# Patient Record
Sex: FEMALE | Race: WHITE | NOT HISPANIC OR LATINO | URBAN - METROPOLITAN AREA
[De-identification: names, ages, dates, MRNs, and addresses within clinical notes are randomized per-mention and may not be internally consistent; named-entity substitution may affect disease eponyms.]

---

## 2017-03-30 ENCOUNTER — EMERGENCY (EMERGENCY)
Facility: HOSPITAL | Age: 52
LOS: 1 days | Discharge: HOME | End: 2017-03-30
Admitting: EMERGENCY MEDICINE
Payer: COMMERCIAL

## 2017-03-30 VITALS
RESPIRATION RATE: 15 BRPM | DIASTOLIC BLOOD PRESSURE: 62 MMHG | SYSTOLIC BLOOD PRESSURE: 131 MMHG | OXYGEN SATURATION: 100 % | HEART RATE: 73 BPM | TEMPERATURE: 98 F

## 2017-03-30 PROCEDURE — 12001 RPR S/N/AX/GEN/TRNK 2.5CM/<: CPT

## 2017-03-30 PROCEDURE — 99283 EMERGENCY DEPT VISIT LOW MDM: CPT | Mod: 25

## 2017-03-30 PROCEDURE — 73140 X-RAY EXAM OF FINGER(S): CPT | Mod: 26,LT

## 2017-03-30 RX ORDER — TETANUS TOXOID, REDUCED DIPHTHERIA TOXOID AND ACELLULAR PERTUSSIS VACCINE, ADSORBED 5; 2.5; 8; 8; 2.5 [IU]/.5ML; [IU]/.5ML; UG/.5ML; UG/.5ML; UG/.5ML
0.5 SUSPENSION INTRAMUSCULAR ONCE
Qty: 0 | Refills: 0 | Status: COMPLETED | OUTPATIENT
Start: 2017-03-30 | End: 2017-03-30

## 2017-03-30 RX ADMIN — TETANUS TOXOID, REDUCED DIPHTHERIA TOXOID AND ACELLULAR PERTUSSIS VACCINE, ADSORBED 0.5 MILLILITER(S): 5; 2.5; 8; 8; 2.5 SUSPENSION INTRAMUSCULAR at 18:37

## 2017-03-30 NOTE — ED PROVIDER NOTE - MEDICAL DECISION MAKING DETAILS
53 yo F no pmhx here with lac to L thumb s/p cutting self with knife. PLAN: tetanus, xr, suture repair 53 yo F no pmhx here with lac to L thumb s/p cutting self with knife. Laceration small, superficial, on finger tip of L thumb. No evidence of any ligamentous or bony injury. XR clear.  PLAN: tetanus, xr, lac repair 51 yo F no pmhx here with lac to L thumb s/p cutting self with knife. Laceration small, superficial, on finger tip of L thumb. No evidence of any ligamentous or bony injury. XR clear.  PLAN: tetanus, xr, lac repair  [place]hou: Laceration to lt thumb bleeding controled, sensation ok. close laceration.

## 2017-03-30 NOTE — ED PROVIDER NOTE - ATTENDING CONTRIBUTION TO CARE
I performed a history and physical exam of the patient and discussed their management with the advanced care provider. I reviewed the advanced care provider's note and agree with the documented findings and plan of care. My medical decision making and objective findings are found above.  Nl motion, bleeding controlled sensation ok.

## 2017-03-30 NOTE — ED PROVIDER NOTE - PHYSICAL EXAMINATION
L thumb: L thumb: FROM, NVI, sensate intact, opposition intact, no evidence of ligamentous injury. 1 cm rounded laceration to L thumb finger tip. Superficial in nature. Hemostasis.

## 2017-03-30 NOTE — ED PROVIDER NOTE - PLAN OF CARE
Keep area clean and dry for 24 hours. Do not apply any lotions or ointments for 5-7 days. Minimize hand washing/scrubbing. Rest, drink plenty of fluids.  Advance activity as tolerated.  Continue all previously prescribed medications as directed. You can use motrin 600mg every 6-8 hours for pain or fever, and/or Tylenol 650 mg every 4 hours for pain/fever. Follow up with your primary care physician in 48-72 hours- bring copies of your results.  Return to the emergency department for chest pain, shortness of breath, dizziness, or worsening, concerning, or persistent symptoms.

## 2017-03-30 NOTE — ED PROVIDER NOTE - OBJECTIVE STATEMENT
53 yo F no pmhx here with lac to L thumb. Injured self while cutting food, accidently cut finger with knife approx 4:30pm. Denies other injuries. Tetanus not up today. No meds. Denies fever , chills, vomiting, numbness, tingling, weakness 51 yo F no pmhx here with lac to L thumb fingertip. Injured self while cutting food, accidently cut finger with knife approx 4:30pm. + bleeding at home stopped with rag/gauze. Denies other injuries. Tetanus not up to date. No meds taken at home PTA. Denies fever , chills, vomiting, numbness, tingling, weakness

## 2017-03-30 NOTE — ED PROCEDURE NOTE - PROCEDURE ADDITIONAL DETAILS
Pt with 1 cm laceration to finger tip of L first digit   approx 1 cm circular laceration creating flap on finger pad 75% attached   No active bleeding  FROM of digit  NVI   sensate intact  opposition intact  wound irrigated with NS  dermabond applied  wrapped with non adherent and kerlex   patient tolerated well

## 2017-03-30 NOTE — ED PROVIDER NOTE - CARE PLAN
Principal Discharge DX:	Laceration  Instructions for follow-up, activity and diet:	Keep area clean and dry for 24 hours. Do not apply any lotions or ointments for 5-7 days. Minimize hand washing/scrubbing. Rest, drink plenty of fluids.  Advance activity as tolerated.  Continue all previously prescribed medications as directed. You can use motrin 600mg every 6-8 hours for pain or fever, and/or Tylenol 650 mg every 4 hours for pain/fever. Follow up with your primary care physician in 48-72 hours- bring copies of your results.  Return to the emergency department for chest pain, shortness of breath, dizziness, or worsening, concerning, or persistent symptoms.

## 2017-06-20 ENCOUNTER — APPOINTMENT (OUTPATIENT)
Dept: MAMMOGRAPHY | Facility: IMAGING CENTER | Age: 52
End: 2017-06-20

## 2017-06-20 ENCOUNTER — OUTPATIENT (OUTPATIENT)
Dept: OUTPATIENT SERVICES | Facility: HOSPITAL | Age: 52
LOS: 1 days | End: 2017-06-20
Payer: COMMERCIAL

## 2017-06-20 ENCOUNTER — APPOINTMENT (OUTPATIENT)
Dept: ULTRASOUND IMAGING | Facility: IMAGING CENTER | Age: 52
End: 2017-06-20

## 2017-06-20 DIAGNOSIS — Z00.8 ENCOUNTER FOR OTHER GENERAL EXAMINATION: ICD-10-CM

## 2017-06-20 PROCEDURE — 77067 SCR MAMMO BI INCL CAD: CPT

## 2017-06-20 PROCEDURE — 76641 ULTRASOUND BREAST COMPLETE: CPT

## 2017-06-20 PROCEDURE — 77063 BREAST TOMOSYNTHESIS BI: CPT

## 2017-06-27 DIAGNOSIS — N60.12 DIFFUSE CYSTIC MASTOPATHY OF LEFT BREAST: ICD-10-CM

## 2017-06-27 DIAGNOSIS — N60.11 DIFFUSE CYSTIC MASTOPATHY OF RIGHT BREAST: ICD-10-CM

## 2017-06-27 DIAGNOSIS — Z12.31 ENCOUNTER FOR SCREENING MAMMOGRAM FOR MALIGNANT NEOPLASM OF BREAST: ICD-10-CM

## 2018-08-14 ENCOUNTER — EMERGENCY (EMERGENCY)
Facility: HOSPITAL | Age: 53
LOS: 1 days | Discharge: HOME | End: 2018-08-14
Attending: EMERGENCY MEDICINE | Admitting: EMERGENCY MEDICINE
Payer: COMMERCIAL

## 2018-08-14 VITALS
TEMPERATURE: 98 F | RESPIRATION RATE: 14 BRPM | HEART RATE: 65 BPM | SYSTOLIC BLOOD PRESSURE: 119 MMHG | DIASTOLIC BLOOD PRESSURE: 60 MMHG | OXYGEN SATURATION: 100 %

## 2018-08-14 VITALS
TEMPERATURE: 99 F | OXYGEN SATURATION: 100 % | DIASTOLIC BLOOD PRESSURE: 68 MMHG | RESPIRATION RATE: 16 BRPM | SYSTOLIC BLOOD PRESSURE: 138 MMHG | HEART RATE: 73 BPM

## 2018-08-14 PROCEDURE — 99283 EMERGENCY DEPT VISIT LOW MDM: CPT | Mod: 25

## 2018-08-14 PROCEDURE — 73552 X-RAY EXAM OF FEMUR 2/>: CPT | Mod: 26,RT

## 2018-08-14 RX ORDER — ACETAMINOPHEN 500 MG
1 TABLET ORAL
Qty: 50 | Refills: 0 | OUTPATIENT
Start: 2018-08-14

## 2018-08-14 NOTE — ED PROVIDER NOTE - PLAN OF CARE
Take medications as prescribed. Use cold compresses (such as ice in a plastic bag) over affected areas for 15 minutes 3 times a day x 3 days. Follow up with your healthcare provider about this issue (these issues) if symptoms persist beyond 2 weeks.

## 2018-08-14 NOTE — ED ADULT NURSE NOTE - OBJECTIVE STATEMENT
53 year old female presents with right leg pain after falling out of a van saturday no loc appears comfortable states her pain is an 8/10   xrays completed plan of care discussed

## 2018-08-14 NOTE — ED PROVIDER NOTE - CARE PLAN
Principal Discharge DX:	Right thigh pain  Secondary Diagnosis:	Fall Principal Discharge DX:	Right thigh pain  Assessment and plan of treatment:	Take medications as prescribed. Use cold compresses (such as ice in a plastic bag) over affected areas for 15 minutes 3 times a day x 3 days. Follow up with your healthcare provider about this issue (these issues) if symptoms persist beyond 2 weeks.  Secondary Diagnosis:	Fall  Secondary Diagnosis:	Contusion

## 2018-08-14 NOTE — ED PROVIDER NOTE - OBJECTIVE STATEMENT
Dell: 53 F, 2 days ago mechanical fall from van, hit R thigh. Pain, burning, constant, throbbing, failed motrin and ice. No other injury. Worse when bending knee. Dell: 53 F, 2 days ago mechanical fall from van, hit R thigh against van door. Pain, burning, constant, throbbing, failed motrin and ice. No other injury. Worse when bending knee.

## 2018-08-14 NOTE — ED ADULT NURSE NOTE - NSIMPLEMENTINTERV_GEN_ALL_ED
Implemented All Fall Risk Interventions:  [place] to call system. Call bell, personal items and telephone within reach. Instruct patient to call for assistance. Room bathroom lighting operational. Non-slip footwear when patient is off stretcher. Physically safe environment: no spills, clutter or unnecessary equipment. Stretcher in lowest position, wheels locked, appropriate side rails in place. Provide visual cue, wrist band, yellow gown, etc. Monitor gait and stability. Monitor for mental status changes and reorient to person, place, and time. Review medications for side effects contributing to fall risk. Reinforce activity limits and safety measures with patient and family.

## 2019-02-15 ENCOUNTER — APPOINTMENT (OUTPATIENT)
Dept: ULTRASOUND IMAGING | Facility: IMAGING CENTER | Age: 54
End: 2019-02-15

## 2019-02-15 ENCOUNTER — OUTPATIENT (OUTPATIENT)
Dept: OUTPATIENT SERVICES | Facility: HOSPITAL | Age: 54
LOS: 1 days | End: 2019-02-15
Payer: COMMERCIAL

## 2019-02-15 ENCOUNTER — APPOINTMENT (OUTPATIENT)
Dept: MAMMOGRAPHY | Facility: IMAGING CENTER | Age: 54
End: 2019-02-15

## 2019-02-15 DIAGNOSIS — Z00.00 ENCOUNTER FOR GENERAL ADULT MEDICAL EXAMINATION WITHOUT ABNORMAL FINDINGS: ICD-10-CM

## 2019-02-15 PROCEDURE — 76641 ULTRASOUND BREAST COMPLETE: CPT

## 2019-02-15 PROCEDURE — 77063 BREAST TOMOSYNTHESIS BI: CPT

## 2019-02-15 PROCEDURE — 77067 SCR MAMMO BI INCL CAD: CPT

## 2023-10-25 NOTE — ED PROVIDER NOTE - DISPOSITION TYPE
Alyssa Solis is a 64 year old female presenting to the walk-in clinic today alone for evaluation for post nasal drip and cough since Saturday. Denies fevers or chills at home. Negative covid test at home.     Treatment tried prior to visit: Cold Ease cough drops and benadryl at night time.     Swabs/Specimens collected during rooming process:  COVID/FLU/RSV - rapid    Work, School or  note needed: No    New vs Established: Established    Patient would like communication of their results via:        Cell Phone:   Telephone Information:   Mobile [phone]     Okay to leave a message containing results? Yes     DISCHARGE

## 2024-03-07 NOTE — ED ADULT NURSE NOTE - NS ED PATIENT SAFETY CONCERN
Attempted to reach patient for:  RNCM follow up.    Outcome:  RNCM left message for patient.    Next Follow Up:  One week.   No